# Patient Record
Sex: MALE | Race: WHITE | NOT HISPANIC OR LATINO | URBAN - NONMETROPOLITAN AREA
[De-identification: names, ages, dates, MRNs, and addresses within clinical notes are randomized per-mention and may not be internally consistent; named-entity substitution may affect disease eponyms.]

---

## 2017-01-17 PROBLEM — E11.9: Noted: 2017-01-17

## 2017-01-17 PROBLEM — H52.4: Noted: 2017-01-17

## 2017-01-17 PROBLEM — H43.813: Noted: 2017-01-17

## 2017-01-17 PROBLEM — Z96.1: Noted: 2017-01-17

## 2017-01-17 PROBLEM — H26.492: Noted: 2017-01-17

## 2019-01-29 ENCOUNTER — IMPORTED ENCOUNTER (OUTPATIENT)
Dept: URBAN - NONMETROPOLITAN AREA CLINIC 1 | Facility: CLINIC | Age: 74
End: 2019-01-29

## 2019-01-29 PROCEDURE — 92015 DETERMINE REFRACTIVE STATE: CPT

## 2019-01-29 PROCEDURE — 92014 COMPRE OPH EXAM EST PT 1/>: CPT

## 2019-01-29 NOTE — PATIENT DISCUSSION
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 Jameson Torres at [facility] in [place] [address] to [address] with PCO OS - Discussed diagnosis in detail with patient- PCO noted in OS but stable and no treatment needed at this time - Continue to monitor- BAT at next visitFloaters OU- Discussed  signs/symptoms of RD or tear. - Discussed in detail the nature of flashes/floaters and to call if there is a sudden increase in their number.- Continue to monitor; 's Notes: MR 1/29/19DFE 1/23/18Meliton RAMIREZ  [address] [facility] [id number]

## 2020-01-30 ENCOUNTER — IMPORTED ENCOUNTER (OUTPATIENT)
Dept: URBAN - NONMETROPOLITAN AREA CLINIC 1 | Facility: CLINIC | Age: 75
End: 2020-01-30

## 2020-01-30 PROCEDURE — 92014 COMPRE OPH EXAM EST PT 1/>: CPT

## 2020-01-30 PROCEDURE — 92015 DETERMINE REFRACTIVE STATE: CPT

## 2020-01-30 NOTE — PATIENT DISCUSSION
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 Asked him about blood pressure control and he says his medication was just changed and has been better recently. Unsure if hemorrhage is related to DM or blood pressure. Asked him to make sure he is trying to control both as tightly as possible. - Recommend no soda's- Letter to Dr. Tristan Alvares in [address] to [address] [facility] with PCO OS - Discussed diagnosis in detail with patient- PCO noted in OS but stable and no treatment needed at this time - Continue to monitor- BAT at next visitFloaters OU- Discussed  signs/symptoms of RD or tear. - Discussed in detail the nature of flashes/floaters and to call if there is a sudden increase in their number.- Continue to monitor; Dr's Notes: MR 1/29/19DFE 1/23/18PCP:  Tristan Alvares MD @ [facility] [address]

## 2020-07-13 NOTE — PATIENT DISCUSSION
Reviewed OPTIONS including AVASTIN/EYLEA/LUCENTIS/BEOVU and patient wants to proceed with EYLEA treatment AND REPEAT EVERY 29 DAYS X 2 DOI.

## 2020-07-13 NOTE — PROCEDURE NOTE: CLINICAL

## 2020-08-26 NOTE — PROCEDURE NOTE: CLINICAL

## 2020-08-26 NOTE — PATIENT DISCUSSION
8 26 20 PT WIFE CONCERNED ABOUT RISK OF PULM EMBOLI - PT HAS HAD TWO MAJOR ONES - PT IS NOW ON COUMADIN AND HAS NOT HAD ONCE SINCE STARTING COUMADIN - REVIEWED RISK OF PE/EMBOLI WITH EYLEA - ALSO PT IS MONOCULAR AND IF STOP TX VA COULD BE LOST IN HIS GOOD EYE -SINCE HE DID NOT HAVE CLOT AFTER LAST TREATMENT AND HE IS MONOCULAR - PT ACCEPTS RISK AND WANTS TO CONTINUE WITH TREATMENT.

## 2020-09-24 NOTE — PROCEDURE NOTE: CLINICAL

## 2020-11-05 NOTE — PATIENT DISCUSSION
11 5 20 TRACE SRF AT 6 WKS - PT HAD [facility] CONJ HEM REDUCED VA AFTER LAST SHOT AND IS RELUCTANT TO HAVE ANOTHER SHOT - AS LITTLE ATYPICAL AND TRACE FLUID - TO SEE HOW HE DOES OFF MEDS - PT UNDERSTANDS AND ACCEPTS RISK FO IRREVERSIBLE PROGRESSION.

## 2020-12-10 NOTE — PATIENT DISCUSSION
PT WANTS TO WAIT ON TX - TO [facility] IN 6 WKS OR EARLIER IF CHANGE IN VA/AMSLER - PT ACCEPTS VA MAY DROP AND MAY BE IRREVERSIBLE BY WAITING.

## 2021-02-01 ENCOUNTER — IMPORTED ENCOUNTER (OUTPATIENT)
Dept: URBAN - NONMETROPOLITAN AREA CLINIC 1 | Facility: CLINIC | Age: 76
End: 2021-02-01

## 2021-02-01 PROBLEM — H52.4: Noted: 2017-01-17

## 2021-02-01 PROBLEM — E11.3291: Noted: 2021-02-01

## 2021-02-01 PROBLEM — H43.813: Noted: 2017-01-17

## 2021-02-01 PROBLEM — H26.492: Noted: 2017-01-17

## 2021-02-01 PROBLEM — Z96.1: Noted: 2017-01-17

## 2021-02-01 PROCEDURE — 92014 COMPRE OPH EXAM EST PT 1/>: CPT

## 2021-02-01 PROCEDURE — 92015 DETERMINE REFRACTIVE STATE: CPT

## 2021-02-01 NOTE — PATIENT DISCUSSION
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 Bella Arreola in [address] to [address] [facility] with PCO OS - Discussed diagnosis in detail with patient- BAT done today 20/25- OU- PCO noted in OS but stable and no treatment needed at this time - Continue to monitor- BAT at next visitFloaters OU- Discussed  signs/symptoms of RD or tear. - Discussed in detail the nature of flashes/floaters and to call if there is a sudden increase in their number.- Continue to monitor; Dr's Notes: MR 1/29/19DFE 1/23/18PCP:  Bella Arreola MD @ [facility] [address]

## 2022-02-02 ENCOUNTER — CONSULTATION/EVALUATION (OUTPATIENT)
Dept: URBAN - NONMETROPOLITAN AREA CLINIC 1 | Facility: CLINIC | Age: 77
End: 2022-02-02

## 2022-02-02 DIAGNOSIS — H52.4: ICD-10-CM

## 2022-02-02 PROCEDURE — 92015 DETERMINE REFRACTIVE STATE: CPT

## 2022-02-02 PROCEDURE — 92014 COMPRE OPH EXAM EST PT 1/>: CPT

## 2022-02-02 ASSESSMENT — VISUAL ACUITY
OS_SC: 20/22-
OS_BAT: 20/30
OD_SC: 20/40
OD_BAT: 20/60

## 2022-02-02 ASSESSMENT — TONOMETRY
OS_IOP_MMHG: 12
OD_IOP_MMHG: 12

## 2022-02-02 NOTE — PATIENT DISCUSSION
July 2021: Christina Collado out fracture OS with restriction adduction movement greater than abduction movement.

## 2022-02-02 NOTE — PATIENT DISCUSSION
Discussed diagnosis in detail with patient. *Mild diabetic retinopathy noted on exam today OU. Stressed importance of good blood sugar control and how it can affect ocular health/vision. Recommend no soda’s.  Letter will be sent to PCP today as well. Continue to monitor closely for changes.

## 2022-02-02 NOTE — PATIENT DISCUSSION
Discussed diagnosis in detail with patient. Mr re-checked today by Dr. Phyllis Swartz and New glasses RX given. Continue to monitor.

## 2022-04-09 ASSESSMENT — VISUAL ACUITY
OS_GLARE: 20/25-
OD_CC: 20/30-2
OS_CC: 20/25
OD_GLARE: 20/40
OS_CC: 20/20-2
OD_CC: 20/25-1
OS_CC: 20/25-2
OS_GLARE: 20/30-
OU_CC: 20/20
OD_GLARE: 20/25-
OD_CC: 20/25+2

## 2022-04-09 ASSESSMENT — TONOMETRY
OD_IOP_MMHG: 16
OD_IOP_MMHG: 14
OD_IOP_MMHG: 14
OS_IOP_MMHG: 16
OS_IOP_MMHG: 14
OS_IOP_MMHG: 14

## 2023-08-18 ENCOUNTER — FOLLOW UP (OUTPATIENT)
Dept: URBAN - NONMETROPOLITAN AREA CLINIC 1 | Facility: CLINIC | Age: 78
End: 2023-08-18

## 2023-08-18 DIAGNOSIS — E11.3391: ICD-10-CM

## 2023-08-18 DIAGNOSIS — H26.492: ICD-10-CM

## 2023-08-18 DIAGNOSIS — E11.3292: ICD-10-CM

## 2023-08-18 DIAGNOSIS — Z79.4: ICD-10-CM

## 2023-08-18 PROCEDURE — 99214 OFFICE O/P EST MOD 30 MIN: CPT

## 2023-08-18 PROCEDURE — 92134 CPTRZ OPH DX IMG PST SGM RTA: CPT

## 2023-08-18 ASSESSMENT — VISUAL ACUITY
OS_SC: 20/30-1
OD_PH: 20/30-1
OU_SC: 20/30+1
OD_SC: 20/40-1

## 2023-08-18 ASSESSMENT — TONOMETRY
OS_IOP_MMHG: 14
OD_IOP_MMHG: 14

## 2024-03-13 ENCOUNTER — FOLLOW UP (OUTPATIENT)
Dept: URBAN - NONMETROPOLITAN AREA CLINIC 1 | Facility: CLINIC | Age: 79
End: 2024-03-13

## 2024-03-13 DIAGNOSIS — H52.4: ICD-10-CM

## 2024-03-13 PROCEDURE — 92014 COMPRE OPH EXAM EST PT 1/>: CPT

## 2024-03-13 PROCEDURE — 92015 DETERMINE REFRACTIVE STATE: CPT

## 2024-03-13 ASSESSMENT — VISUAL ACUITY
OD_SC: 20/50-2
OD_BAT: 20/50
OS_SC: 20/29
OU_SC: 20/25-2
OD_PH: 20/32
OS_BAT: 20/50

## 2024-03-13 ASSESSMENT — TONOMETRY
OD_IOP_MMHG: 15
OS_IOP_MMHG: 14

## 2025-03-17 ENCOUNTER — COMPREHENSIVE EXAM (OUTPATIENT)
Age: 80
End: 2025-03-17

## 2025-03-17 DIAGNOSIS — H52.4: ICD-10-CM

## 2025-03-17 PROCEDURE — 92014 COMPRE OPH EXAM EST PT 1/>: CPT

## 2025-03-17 PROCEDURE — 92015 DETERMINE REFRACTIVE STATE: CPT
